# Patient Record
Sex: FEMALE | Race: WHITE | NOT HISPANIC OR LATINO | Employment: FULL TIME | URBAN - METROPOLITAN AREA
[De-identification: names, ages, dates, MRNs, and addresses within clinical notes are randomized per-mention and may not be internally consistent; named-entity substitution may affect disease eponyms.]

---

## 2021-05-25 ENCOUNTER — RECORDS - HEALTHEAST (OUTPATIENT)
Dept: ADMINISTRATIVE | Facility: CLINIC | Age: 56
End: 2021-05-25

## 2021-05-30 ENCOUNTER — RECORDS - HEALTHEAST (OUTPATIENT)
Dept: ADMINISTRATIVE | Facility: CLINIC | Age: 56
End: 2021-05-30

## 2021-07-13 ENCOUNTER — RECORDS - HEALTHEAST (OUTPATIENT)
Dept: ADMINISTRATIVE | Facility: CLINIC | Age: 56
End: 2021-07-13

## 2021-07-21 ENCOUNTER — RECORDS - HEALTHEAST (OUTPATIENT)
Dept: ADMINISTRATIVE | Facility: CLINIC | Age: 56
End: 2021-07-21

## 2021-07-22 ENCOUNTER — RECORDS - HEALTHEAST (OUTPATIENT)
Dept: SCHEDULING | Facility: CLINIC | Age: 56
End: 2021-07-22

## 2021-07-22 DIAGNOSIS — Z12.31 OTHER SCREENING MAMMOGRAM: ICD-10-CM

## 2023-07-21 ENCOUNTER — MEDICAL CORRESPONDENCE (OUTPATIENT)
Dept: HEALTH INFORMATION MANAGEMENT | Facility: CLINIC | Age: 58
End: 2023-07-21
Payer: COMMERCIAL

## 2023-07-27 ENCOUNTER — TRANSCRIBE ORDERS (OUTPATIENT)
Dept: OTHER | Age: 58
End: 2023-07-27

## 2023-07-27 DIAGNOSIS — G93.2 PSEUDOTUMOR: Primary | ICD-10-CM

## 2023-07-27 DIAGNOSIS — M75.01 ADHESIVE CAPSULITIS OF RIGHT SHOULDER: ICD-10-CM

## 2023-07-27 DIAGNOSIS — M19.011 PRIMARY OSTEOARTHRITIS OF RIGHT SHOULDER: ICD-10-CM

## 2023-08-03 ENCOUNTER — TELEPHONE (OUTPATIENT)
Dept: ORTHOPEDICS | Facility: CLINIC | Age: 58
End: 2023-08-03
Payer: COMMERCIAL

## 2023-08-03 NOTE — TELEPHONE ENCOUNTER
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 She is scheduled with Dr. Dwyer on 8/10.     Writer spoke with her and gathered the following information about imaging:  MRI rt shoulder doen mid July [facility]  6/30 x-ray rt shoulder [facility]    Patient told to plan on 8/10 appointment unless she heard from me with a plan B.    Imaging being requested.    Mireille Diaz LPN

## 2023-08-04 ENCOUNTER — TELEPHONE (OUTPATIENT)
Dept: ORTHOPEDICS | Facility: CLINIC | Age: 58
End: 2023-08-04
Payer: COMMERCIAL

## 2023-08-04 NOTE — TELEPHONE ENCOUNTER
Writer spoke with patient and explained that Dr. Dwyer is an orthopedic oncologist and she does not need to see an oncologist, just an orthopedic shoulder surgeon.    Mireille Diaz LPN

## 2023-08-04 NOTE — TELEPHONE ENCOUNTER
DIAGNOSIS: Patient referred by Marcy PARRA for pseudotumor of rt shoulder - was going to see Clohisy but appt was canceled and rescheduled for [place].    APPOINTMENT DATE: 8/7/23   NOTES STATUS DETAILS   OFFICE NOTE from referring provider Care Everywhere 7/20/23, 6/30/23 - Marcy Avelar PA-C - [facility] [place]    OFFICE NOTE from other specialist Care Everywhere 6/28/23 - Abhishek Morrison MD - [facility] - R arm pain    MEDICATION LIST Care Everywhere    LABS     MRI Received [facility]:   7/11/23 - Humerus R    7/11/23 - Shoulder R   XRAYS (IMAGES & REPORTS) Received [facility]:   6/28/23 - Humerus R    6/28/23- Shoulder R

## 2023-08-04 NOTE — TELEPHONE ENCOUNTER
Writer left  for patient telling her I had her images triaged and our PA states she does not need to be seen by a Orthopedic Oncologist, but rather by a shoulder surgeon. I told her I would be cancelling her appointment with Dr. Dwyer on 8/10 and will be contatcing one of our clinic coordinators to help her get scheduled with a shoulder surgeon.    Mireille Diaz LPN

## 2023-08-05 NOTE — PROGRESS NOTES
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  Her shoulder pain is better but her pain is mainly located in her humerus.  Symptoms started on 6/17/23. There was not a precipitating event. The pain is located to the anterior and lateral shoulder. Worst pain is rated a 7 of 10, and current pain is rated at 3 of 10. Symptoms are worsened by movement and reaching and sleeping, pressure, pushing or pulling. Symptoms are improved with ice, rest. Patient has tried physical therapy and USG GH steroid injection 7/20/23 with good relief. Associated symptoms include lack of ROM, constant dull, pain, stiffness, and throbbing. Patient has no symptoms radiating down the arm, with no numbness. Notably, the patient has had no history of surgery. No other concerns or complaints at this time.  SANE score R - 75 (before CSI 25) L - 100    PAST MEDICAL HISTORY:  No past medical history on file.    PAST SURGICAL HISTORY:  No past surgical history on file.    CURRENT MEDICATIONS:  No current outpatient medications on file.       ALLERGIES:    Not on File      FAMILY HISTORY: No pertinent family history, reviewed in EMR.    SOCIAL HISTORY:   Social History     Socioeconomic History    Marital status:      Spouse name: Not on file    Number of children: Not on file    Years of education: Not on file    Highest education level: Not on file   Occupational History    Not on file   Tobacco Use    Smoking status: Not on file    Smokeless tobacco: Not on file   Substance and Sexual Activity    Alcohol use: Not on file    Drug use: Not on file    Sexual activity: Not on file   Other Topics Concern    Not on file   Social History Narrative    Not on file     Social Determinants of Health      Financial Resource Strain: Not on file   Food Insecurity: Not on file   Transportation Needs: Not on file   Physical Activity: Not on file   Stress: Not on file   Social Connections: Not on file   Intimate Partner Violence: Not on file   Housing Stability: Not on file       REVIEW OF SYSTEMS: Positive for that noted in past medical history and history of present illness and otherwise reviewed in EMR    PHYSICAL EXAM:  Patient is Data Unavailable and weighs 0 lbs 0 oz There were no vitals taken for this visit.  There is no height or weight on file to calculate BMI.   Constitutional: Well-developed, well-nourished, healthy appearing female.  Skin: Warm, dry   HEENT: Normal  Cardiac: Well perfused extremities, strong 2+ peripheral pulses. No edema.   Pulmonary: Breathing room air    Musculoskeletal:   Right Shoulder:  AROM right shoulder: 110/110/50/T12   AROM left shoulder: 170/170/60/T10   PROM right shoulder: 120/120/50/T12   5/5 supraspinatus, 5/5 infraspinatus, 5/5 subscapularis  No tenderness to palpation along humerus  no AC joint pain, negative cross body adduction  positive Neer and Viramontes impingement signs  negative belly-press/lift-off  negative Speed's test  negative Apprehension  Neurovascular exam and cervical spine exam are normal.    IMAGING: I personally reviewed the prior xrays and MRI which demonstrate no evidence of full-thickness rotator cuff tear, no substantial glenohumeral joint space narrowing, there is a small area of the lateral cortical irregularity on the proximal third humeral shaft that may represent a pseudotumor, this does not appear to violate the cortex and does not appear to be expansile    IMPRESSION: 58 year old-year-old right hand dominant female, with right shoulder adhesive capsulitis.     PLAN:     I discussed with the patient the etiology of their condition. We discussed at length the options as noted above.  I went through the results of the x-ray and MRI with the  patient today.  We discussed that I think that the primary issue is more of adhesive capsulitis.  I think that the question of the pseudotumor at the deltoid insertion may be causing some deltoid insertional pain but I do not think that the lesion itself is worrisome or the cause of pain.  I believe that the primary issue is the adhesive capsulitis given the patient's restricted range of motion.  The patient is already had a cortisone injection, has physical therapy scheduled, we discussed the importance of physical therapy and home exercises as well as the use of over-the-counter anti-inflammatories.  We also discussed that this can take several months and up to a year to really recover from.  We discussed that once the patient has recovered fully from the adhesive capsulitis if pain remains this could be further investigated to see if the lesion is truly a source of pain especially with irritation of the deltoid.  Patient is going to continue with physical therapy and will follow-up in 3 to 4 months if needed.    At the conclusion of the office visit, Radha verbally acknowledged that I answered all of her questions satisfactorily.    India Bahena MD  Orthopedic Surgery Sports Medicine and Shoulder Surgery

## 2023-08-07 ENCOUNTER — PRE VISIT (OUTPATIENT)
Dept: ORTHOPEDICS | Facility: CLINIC | Age: 58
End: 2023-08-07

## 2023-08-07 ENCOUNTER — OFFICE VISIT (OUTPATIENT)
Dept: ORTHOPEDICS | Facility: CLINIC | Age: 58
End: 2023-08-07
Payer: COMMERCIAL

## 2023-08-07 DIAGNOSIS — M75.01 ADHESIVE CAPSULITIS OF RIGHT SHOULDER: Primary | ICD-10-CM

## 2023-08-07 PROCEDURE — 99203 OFFICE O/P NEW LOW 30 MIN: CPT | Performed by: ORTHOPAEDIC SURGERY

## 2023-08-07 RX ORDER — ESTRADIOL 10 UG/1
INSERT VAGINAL
COMMUNITY

## 2023-08-07 NOTE — LETTER
8/7/2023         RE: Radha Trujillo  [address] [facility] [address]        Dear Colleague,    Thank you for referring your patient, Radha Trujillo, to the [facility] [place]. Please see a copy of my visit note below.    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  Her shoulder pain is better but her pain is mainly located in her humerus.  Symptoms started on 6/17/23. There was not a precipitating event. The pain is located to the anterior and lateral shoulder. Worst pain is rated a 7 of 10, and current pain is rated at 3 of 10. Symptoms are worsened by movement and reaching and sleeping, pressure, pushing or pulling. Symptoms are improved with ice, rest. Patient has tried physical therapy and USG GH steroid injection 7/20/23 with good relief. Associated symptoms include lack of ROM, constant dull, pain, stiffness, and throbbing. Patient has no symptoms radiating down the arm, with no numbness. Notably, the patient has had no history of surgery. No other concerns or complaints at this time.  SANE score R - 75 (before CSI 25) L - 100    PAST MEDICAL HISTORY:  No past medical history on file.    PAST SURGICAL HISTORY:  No past surgical history on file.    CURRENT MEDICATIONS:  No current outpatient medications on file.       ALLERGIES:    Not on File      FAMILY HISTORY: No pertinent family history, reviewed in EMR.    SOCIAL HISTORY:   Social History     Socioeconomic History    Marital status:      Spouse name: Not on file    Number of children: Not on file    Years of education: Not on file    Highest education level: Not on file   Occupational History    Not on file   Tobacco Use    Smoking status: Not on file    Smokeless  tobacco: Not on file   Substance and Sexual Activity    Alcohol use: Not on file    Drug use: Not on file    Sexual activity: Not on file   Other Topics Concern    Not on file   Social History Narrative    Not on file     Social Determinants of Health     Financial Resource Strain: Not on file   Food Insecurity: Not on file   Transportation Needs: Not on file   Physical Activity: Not on file   Stress: Not on file   Social Connections: Not on file   Intimate Partner Violence: Not on file   Housing Stability: Not on file       REVIEW OF SYSTEMS: Positive for that noted in past medical history and history of present illness and otherwise reviewed in EMR    PHYSICAL EXAM:  Patient is Data Unavailable and weighs 0 lbs 0 oz There were no vitals taken for this visit.  There is no height or weight on file to calculate BMI.   Constitutional: Well-developed, well-nourished, healthy appearing female.  Skin: Warm, dry   HEENT: Normal  Cardiac: Well perfused extremities, strong 2+ peripheral pulses. No edema.   Pulmonary: Breathing room air    Musculoskeletal:   Right Shoulder:  AROM right shoulder: 110/110/50/T12   AROM left shoulder: 170/170/60/T10   PROM right shoulder: 120/120/50/T12   5/5 supraspinatus, 5/5 infraspinatus, 5/5 subscapularis  No tenderness to palpation along humerus  no AC joint pain, negative cross body adduction  positive Neer and Viramontes impingement signs  negative belly-press/lift-off  negative Speed's test  negative Apprehension  Neurovascular exam and cervical spine exam are normal.    IMAGING: I personally reviewed the prior xrays and MRI which demonstrate no evidence of full-thickness rotator cuff tear, no substantial glenohumeral joint space narrowing, there is a small area of the lateral cortical irregularity on the proximal third humeral shaft that may represent a pseudotumor, this does not appear to violate the cortex and does not appear to be expansile    IMPRESSION: 58 year old-year-old right  hand dominant female, with right shoulder adhesive capsulitis.     PLAN:     I discussed with the patient the etiology of their condition. We discussed at length the options as noted above.  I went through the results of the x-ray and MRI with the patient today.  We discussed that I think that the primary issue is more of adhesive capsulitis.  I think that the question of the pseudotumor at the deltoid insertion may be causing some deltoid insertional pain but I do not think that the lesion itself is worrisome or the cause of pain.  I believe that the primary issue is the adhesive capsulitis given the patient's restricted range of motion.  The patient is already had a cortisone injection, has physical therapy scheduled, we discussed the importance of physical therapy and home exercises as well as the use of over-the-counter anti-inflammatories.  We also discussed that this can take several months and up to a year to really recover from.  We discussed that once the patient has recovered fully from the adhesive capsulitis if pain remains this could be further investigated to see if the lesion is truly a source of pain especially with irritation of the deltoid.  Patient is going to continue with physical therapy and will follow-up in 3 to 4 months if needed.    At the conclusion of the office visit, Radha verbally acknowledged that I answered all of her questions satisfactorily.    India Bahena MD  Orthopedic Surgery Sports Medicine and Shoulder Surgery